# Patient Record
Sex: FEMALE | Race: WHITE | Employment: FULL TIME | URBAN - METROPOLITAN AREA
[De-identification: names, ages, dates, MRNs, and addresses within clinical notes are randomized per-mention and may not be internally consistent; named-entity substitution may affect disease eponyms.]

---

## 2023-02-01 DIAGNOSIS — Z86.69 H/O MIGRAINE: ICD-10-CM

## 2023-02-01 DIAGNOSIS — F41.1 GENERALIZED ANXIETY DISORDER: ICD-10-CM

## 2023-02-01 DIAGNOSIS — F41.8 DEPRESSION WITH ANXIETY: ICD-10-CM

## 2023-02-01 PROBLEM — F32.A CLINICAL DEPRESSION: Status: ACTIVE | Noted: 2023-02-01

## 2023-02-01 PROBLEM — F32.A CLINICAL DEPRESSION: Status: RESOLVED | Noted: 2023-02-01 | Resolved: 2023-02-01

## 2023-02-02 ENCOUNTER — OFFICE VISIT (OUTPATIENT)
Dept: OCCUPATIONAL MEDICINE | Age: 33
End: 2023-02-02

## 2023-02-02 VITALS
OXYGEN SATURATION: 99 % | DIASTOLIC BLOOD PRESSURE: 70 MMHG | SYSTOLIC BLOOD PRESSURE: 122 MMHG | TEMPERATURE: 98.1 F | HEART RATE: 79 BPM | RESPIRATION RATE: 16 BRPM

## 2023-02-02 DIAGNOSIS — J31.0 RHINOSINUSITIS: ICD-10-CM

## 2023-02-02 DIAGNOSIS — H65.06 RECURRENT ACUTE SEROUS OTITIS MEDIA OF BOTH EARS: Primary | ICD-10-CM

## 2023-02-02 DIAGNOSIS — J32.9 RHINOSINUSITIS: ICD-10-CM

## 2023-02-02 ASSESSMENT — ENCOUNTER SYMPTOMS
EYE REDNESS: 0
COUGH: 0
SINUS PAIN: 0
EYE DISCHARGE: 0
ABDOMINAL PAIN: 0
RHINORRHEA: 1
EYE PAIN: 0
DIARRHEA: 0
EYE ITCHING: 1
VOMITING: 0
SORE THROAT: 0
SINUS PRESSURE: 1

## 2023-02-02 NOTE — PROGRESS NOTES
PROGRESS NOTE    SUBJECTIVE:   Magaly Duran is a 28 y.o. female seen for intermittent ear fullness and ringing in her ears and that it is sometimes associated with nasal congestion, runy nose, itchy ears, itchy eyes, and a itchy/scratchy throat. States that this has been a chronic issue and that she was told it was tinnitus when she was last evaluated for this when she was still living in [place] [address]. Has been living in the [facility] since August and reports she has never been diagnosed with seasonal or environmental allergy issues. Chief Complaint    Ear Fullness         Ear Fullness   There is pain in both ears. This is a chronic problem. The problem has been gradually worsening. There has been no fever. The pain is moderate. Associated symptoms include headaches, hearing loss and rhinorrhea. Pertinent negatives include no abdominal pain, coughing, diarrhea, ear discharge, neck pain, rash, sore throat or vomiting. She has tried nothing for the symptoms. Current Outpatient Medications   Medication Sig Dispense Refill    diazePAM (VALIUM PO) Take by mouth as needed       No current facility-administered medications for this visit. No Known Allergies    Social History     Tobacco Use    Smoking status: Never    Smokeless tobacco: Never        Review of Systems   Constitutional:  Negative for chills, fatigue and fever. HENT:  Positive for congestion, ear pain, hearing loss, postnasal drip, rhinorrhea, sinus pressure and sneezing. Negative for ear discharge, sinus pain and sore throat. Eyes:  Positive for itching. Negative for pain, discharge and redness. Respiratory:  Negative for cough. Gastrointestinal:  Negative for abdominal pain, diarrhea and vomiting. Musculoskeletal:  Negative for arthralgias, myalgias and neck pain. Skin:  Negative for rash. Neurological:  Positive for headaches. Negative for dizziness and light-headedness.         OBJECTIVE:  /70 (Site: Left Upper Arm, Position: Sitting, Cuff Size: Medium Adult)   Pulse 79   Temp 98.1 °F (36.7 °C) (Oral)   Resp 16   SpO2 99%      No results found for this visit on 02/02/23. Physical Exam  Vitals reviewed. Constitutional:       General: She is awake. She is not in acute distress. Appearance: Normal appearance. She is well-developed and well-groomed. HENT:      Head: Normocephalic. Right Ear: Hearing, ear canal and external ear normal. No drainage, swelling or tenderness. A middle ear effusion is present. Left Ear: Hearing, ear canal and external ear normal. No drainage, swelling or tenderness. A middle ear effusion is present. Nose: Mucosal edema and rhinorrhea present. Rhinorrhea is clear. Right Turbinates: Swollen and pale. Left Turbinates: Swollen and pale. Right Sinus: No maxillary sinus tenderness or frontal sinus tenderness. Left Sinus: No maxillary sinus tenderness or frontal sinus tenderness. Mouth/Throat:      Mouth: Mucous membranes are moist.      Pharynx: Oropharynx is clear. Uvula midline. No pharyngeal swelling or posterior oropharyngeal erythema. Tonsils: No tonsillar exudate. Comments: PND with cobblestoning  Eyes:      General: Lids are normal. No allergic shiner. Conjunctiva/sclera: Conjunctivae normal.      Right eye: Right conjunctiva is not injected. No chemosis. Left eye: Left conjunctiva is not injected. No chemosis. Neck:      Thyroid: No thyromegaly. Trachea: Trachea normal.   Cardiovascular:      Rate and Rhythm: Normal rate and regular rhythm. Heart sounds: Normal heart sounds. Pulmonary:      Effort: Pulmonary effort is normal.      Breath sounds: Normal breath sounds. Musculoskeletal:      Cervical back: Normal range of motion and neck supple. Lymphadenopathy:      Head:      Right side of head: No submental, submandibular, tonsillar, preauricular, posterior auricular or occipital adenopathy.       Left side of head: No submental, submandibular, tonsillar, preauricular, posterior auricular or occipital adenopathy. Skin:     General: Skin is warm and dry. Neurological:      Mental Status: She is alert and oriented to person, place, and time. Psychiatric:         Behavior: Behavior is cooperative. ASSESSMENT and PLAN    Aric Khan was seen today for ear fullness. Diagnoses and all orders for this visit:    Recurrent acute serous otitis media of both ears  Comments:  Start daily 24° antihistamine like cetirizine for better allergy management and to promote eustachian tube patentcy. RTC as needed for reevaluation if worsens    Rhinosinusitis  Comments:  Start daily 24° antihistamine like cetirizine for better allergy management, can buy OTC in bulk at [employer]. RTC as needed      Counseled on benefits of having a primary care provider which includes, but is not limited to, continuity of care and having a medical home when concerns arise. Also enforced that onsite clinic policy states that we are not to take the place of a primary care provider, pt verbalized understanding. SEs and risk vs benefits associated with medications prescribed discussed with patient who verbalized understanding. Pt verbalized understanding and agreement with plan of care. RTC for persisting/worsening symptoms or new complaints that arise. Discussed signs and symptoms that would warrant immediate evaluation including, but not limited to HA, blurred vision, speech disturbance, difficulty with ambulation/gait, numbness, tingling, weakness, syncope, chest pain, or shortness of breath. I have reviewed the patient's medication list, past medical, family, social, and surgical history in detail and updated the patient record appropriately.     Madan Perez, APRN - CNP

## 2023-02-07 ENCOUNTER — OFFICE VISIT (OUTPATIENT)
Dept: OCCUPATIONAL MEDICINE | Age: 33
End: 2023-02-07

## 2023-02-07 ENCOUNTER — TELEPHONE (OUTPATIENT)
Dept: OCCUPATIONAL MEDICINE | Age: 33
End: 2023-02-07

## 2023-02-07 VITALS
OXYGEN SATURATION: 99 % | HEART RATE: 76 BPM | SYSTOLIC BLOOD PRESSURE: 112 MMHG | RESPIRATION RATE: 16 BRPM | DIASTOLIC BLOOD PRESSURE: 78 MMHG | TEMPERATURE: 98.4 F

## 2023-02-07 DIAGNOSIS — H10.31 ACUTE CONJUNCTIVITIS OF RIGHT EYE, UNSPECIFIED ACUTE CONJUNCTIVITIS TYPE: ICD-10-CM

## 2023-02-07 DIAGNOSIS — Z91.89: ICD-10-CM

## 2023-02-07 DIAGNOSIS — Z91.89: Primary | ICD-10-CM

## 2023-02-07 RX ORDER — OFLOXACIN 3 MG/ML
2 SOLUTION/ DROPS OPHTHALMIC 4 TIMES DAILY
Qty: 5 ML | Refills: 0 | Status: SHIPPED | OUTPATIENT
Start: 2023-02-07 | End: 2023-02-14

## 2023-02-07 RX ORDER — OFLOXACIN 3 MG/ML
2 SOLUTION/ DROPS OPHTHALMIC 4 TIMES DAILY
Qty: 5 ML | Refills: 0 | Status: SHIPPED | OUTPATIENT
Start: 2023-02-07 | End: 2023-02-07 | Stop reason: SDUPTHER

## 2023-02-07 ASSESSMENT — ENCOUNTER SYMPTOMS
EYE REDNESS: 1
DOUBLE VISION: 0
EYE ITCHING: 0
STRIDOR: 0
SINUS PRESSURE: 1
SWOLLEN GLANDS: 0
SINUS PAIN: 0
COUGH: 1
RHINORRHEA: 0
SORE THROAT: 1
EYE DISCHARGE: 1
CHEST TIGHTNESS: 0
PHOTOPHOBIA: 0
EYE PAIN: 0

## 2023-02-07 ASSESSMENT — VISUAL ACUITY: OU: 1

## 2023-02-07 NOTE — PROGRESS NOTES
PROGRESS NOTE    SUBJECTIVE:   Cynthia Holcomb is a 28 y.o. female seen for eye irritation and drainage that started this morning. Reports that she accidentally got her mascara wand and mascara into her right eye this weekend and that she was wearing contact lens at the time of injury. Confirms that the eye remained \"quite irritated\" but did not have any discharge from the eye until today. Chief Complaint    Eye Drainage         Conjunctivitis   The current episode started today. The onset was gradual. The problem has been gradually worsening. The problem is mild. Nothing relieves the symptoms. Associated symptoms include congestion, sore throat, cough, eye discharge and eye redness. Pertinent negatives include no fever, no decreased vision, no double vision, no eye itching, no photophobia, no ear discharge, no ear pain, no headaches, no hearing loss, no mouth sores, no rhinorrhea, no stridor, no swollen glands and no eye pain. The eye pain is mild. The right eye is affected. The eye pain is not associated with movement. The eyelid exhibits redness. The cough is Non-productive and dry. There is Nasal congestion. The congestion Does not interfere with sleep. The congestion Does not interfere with eating or drinking. She has been experiencing a mild sore throat. The sore throat is characterized by Pain only. There were no sick contacts. Current Outpatient Medications   Medication Sig Dispense Refill    ofloxacin (OCUFLOX) 0.3 % solution Place 2 drops into the right eye 4 times daily for 7 days 5 mL 0    diazePAM (VALIUM PO) Take by mouth as needed       No current facility-administered medications for this visit. No Known Allergies    Social History     Tobacco Use    Smoking status: Never    Smokeless tobacco: Never        Review of Systems   Constitutional:  Positive for fatigue. Negative for chills and fever. HENT:  Positive for congestion, sinus pressure and sore throat.  Negative for ear discharge, ear pain, hearing loss, mouth sores, postnasal drip, rhinorrhea, sinus pain and sneezing. Eyes:  Positive for discharge and redness. Negative for double vision, photophobia, pain and itching. Respiratory:  Positive for cough. Negative for chest tightness and stridor. Cardiovascular:  Negative for chest pain. Musculoskeletal:  Negative for arthralgias and myalgias. Neurological:  Negative for dizziness, light-headedness and headaches. OBJECTIVE:  /78 (Site: Left Upper Arm, Position: Sitting, Cuff Size: Medium Adult)   Pulse 76   Temp 98.4 °F (36.9 °C) (Oral)   Resp 16   SpO2 99%      No results found for this visit on 02/07/23. Physical Exam  Vitals reviewed. Constitutional:       General: She is awake. She is not in acute distress. Appearance: Normal appearance. She is well-developed and well-groomed. HENT:      Head: Normocephalic. Right Ear: Hearing and external ear normal. No drainage, swelling or tenderness. Left Ear: Hearing and external ear normal. No drainage, swelling or tenderness. Nose:      Right Sinus: No maxillary sinus tenderness or frontal sinus tenderness. Left Sinus: No maxillary sinus tenderness or frontal sinus tenderness. Mouth/Throat:      Mouth: Mucous membranes are moist.      Pharynx: Oropharynx is clear. Uvula midline. Eyes:      General: Lids are normal. Lids are everted, no foreign bodies appreciated. Vision grossly intact. No allergic shiner. Conjunctiva/sclera:      Right eye: Right conjunctiva is injected. No chemosis, exudate or hemorrhage. Left eye: Left conjunctiva is not injected. No chemosis. Pupils: Pupils are equal, round, and reactive to light. Neck:      Thyroid: No thyromegaly. Trachea: Trachea normal.   Cardiovascular:      Rate and Rhythm: Normal rate and regular rhythm. Heart sounds: Normal heart sounds.    Pulmonary:      Effort: Pulmonary effort is normal.      Breath sounds: Normal breath sounds. Musculoskeletal:      Cervical back: Normal range of motion and neck supple. Skin:     General: Skin is warm and dry. Neurological:      Mental Status: She is alert and oriented to person, place, and time. Psychiatric:         Behavior: Behavior is cooperative. ASSESSMENT and PLAN    Rufina Cronin was seen today for eye drainage. Diagnoses and all orders for this visit:    At high risk for corneal abrasion  Comments:  Ofloxacin drops as prescribed as she reports injury. Refrain from contact lens use until resolved and ensure to use a fresh pair to prevent further injury  Orders:  -     ofloxacin (OCUFLOX) 0.3 % solution; Place 2 drops into the right eye 4 times daily for 7 days    Acute conjunctivitis of right eye, unspecified acute conjunctivitis type  Comments:  Possible viral conjunctivitis since having URI symptoms. May use warm compresses and no tear baby shampoo to cleanse the eye lid and lashes if matting occurs  Orders:  -     ofloxacin (OCUFLOX) 0.3 % solution; Place 2 drops into the right eye 4 times daily for 7 days    Pt to refrain from wearing eye make up while under treatment and to buy new products as old eye makeup and no eye makeup or contact lens use until sclera and conjunctiva are normal and has been 24 hours following course of treatment. SEs and risk vs benefits associated with medications prescribed discussed with patient who verbalized understanding. Pt verbalized understanding and agreement with plan of care. RTC for persisting/worsening symptoms or new complaints that arise.  If no improvement or symptoms worsen pt to see an ophthalmologist, discussed S&S of worsening condition that would warrant immediate evaluation (i.e. severe pain/eye pressure, HA, dizziness, visual changes, fever, chills, foreign body sensation, or obvious wound/trauma)    Counseled on benefits of having a primary care provider which includes, but is not limited to, continuity of care and having a medical home when concerns arise. Also enforced that onsite clinic policy states that we are not to take the place of a primary care provider, pt verbalized understanding. I have reviewed the patient's medication list, past medical, family, social, and surgical history in detail and updated the patient record appropriately.     Raymond Parkinson, APRN - CNP

## 2023-02-07 NOTE — TELEPHONE ENCOUNTER
Patient contacted clinic reporting that [address] did not have the eye drops in stock and requested that the prescription be sent to the [facility] on [address] in [place] as they have the Ofloxacin in stock. Prescription sent as requested.     Susanna London, SANDRA - CNP